# Patient Record
Sex: MALE | URBAN - METROPOLITAN AREA
[De-identification: names, ages, dates, MRNs, and addresses within clinical notes are randomized per-mention and may not be internally consistent; named-entity substitution may affect disease eponyms.]

---

## 2018-07-25 ENCOUNTER — COMMITTEE REVIEW (OUTPATIENT)
Dept: TRANSPLANT | Facility: CLINIC | Age: 58
End: 2018-07-25

## 2018-07-25 NOTE — COMMITTEE REVIEW
Living Donor Patient Discussion Note Transplant Coordinator:Kathy Tejada  Transplant Surgeon:   Dr Decker, Dr Ackerman    Committee Review Members:  Nephrology Javed Mejias MD, Nabil Ramos MD   Nutrition Swapna Valerio,    Pharmacy Alexis Lara, [facility]    - Clinical Yesenia Peña, [facility], Megan Young, [facility]   Transplant Dian Sharonda Valentino, RN, Analilia Cazares RN, Peter Ackerman MD, Diann Kwok, CATALINO, Catherine Galindo, JAIMEN, Kathy Tejada RN, Richmond Decker MD         Additional Discussion Notes and Findings: I reviewed the statement that the patient made in a long term follow up survey that the testicular pain persists after 15 years since his donation.  The Donor team recommends that the patient see Urology and likely an Ultrasound will be needed.

## 2022-03-31 ENCOUNTER — TELEPHONE (OUTPATIENT)
Dept: TRANSPLANT | Facility: CLINIC | Age: 62
End: 2022-03-31

## 2022-03-31 NOTE — TELEPHONE ENCOUNTER
"Received message from Sharath Andrews RN, via research coordinator at [facility]. Pt donated in 2003 and is following a low carb diet. He reports he has struggled w/ his weight for a long time. He is over 300 lbs and Ht is 77\". He is consuming <100 g cho/day. Has done this for ~2 weeks and has lost 7 lbs so far.     BF- 'never a big BF fan' but wife encourages it; chicken breast today or none   L- eggs (3-5) w/ crowder/sausage (no toast)  D- chicken/meat, some cheese, burgers no bun + salad; other veggies/green veggies 'loves rice, potatoes' so subbing veggies in place of this  Less fruits   Sn- weaning down on the popcorn  Natalya- water, milk (16 oz/day), small amount of cherry juice for gout (1/4 cup), coffee w/ creamer  Alcohol- none     Exercise- tough with winter; just got home from [place] but struggling w/ walking 2/2 sciatic nerve   Put on 10 lbs during winter in [place]    IBW: 208/144%  231 lbs/105 kg     Estimated protein needs:  g/day (0.8-1 g/kg) up to  115-120 grams/day (1.2 g/kg)  Has a physical in a few months- plans to watch creatinine and see if high protein diet has impacted his labs at all     Pt has my phone # to call back with any questions if needed     "